# Patient Record
Sex: FEMALE | Race: WHITE | Employment: FULL TIME
[De-identification: names, ages, dates, MRNs, and addresses within clinical notes are randomized per-mention and may not be internally consistent; named-entity substitution may affect disease eponyms.]

---

## 2017-04-01 DIAGNOSIS — E87.6 HYPOKALEMIA: ICD-10-CM

## 2017-04-01 RX ORDER — POTASSIUM CHLORIDE 1500 MG/1
TABLET, EXTENDED RELEASE ORAL
Qty: 30 TABLET | Refills: 0 | Status: SHIPPED | OUTPATIENT
Start: 2017-04-01

## 2017-04-01 NOTE — LETTER
[facility]  [address]  [address] [facility] [address]      April 1, 2017      Buffy Palencia  [address] [place] [id number]        Dear Bfufy,    This letter is a reminder that you are overdue to see your Primary Care Provider for an Annual Visit and needed labs. You must be seen by your Primary Care Provider on a yearly basis and have appropriate labs drawn for continued care and prescription refills. Please call [phone] to schedule an appointment for an Annual Visit with Dr Angel QUILES.       [facility],    Primary Care Center

## 2017-04-01 NOTE — TELEPHONE ENCOUNTER
potassium chloride SA (POTASSIUM CHLORIDE) 20 MEQ tablet  Last Written Prescription Date:  12/30/15  Last Fill Quantity: 180,   # refills: 3  Last Office Visit with [facility], P or [facility] prescribing provider: 1/12/15  Future Office visit:   None    Potassium   Date Value Ref Range Status   12/24/2015 3.3 (L) 3.4 - 5.3 mmol/L Final   ]  appt letter sent      Routing refill request to provider for review/approval because:  potassium chloride SA (POTASSIUM CHLORIDE) 20 MEQ tablet    lv 1/15. Last labs 12/15. Rf? Qty?

## 2017-05-02 DIAGNOSIS — E87.6 HYPOKALEMIA: ICD-10-CM

## 2017-05-03 RX ORDER — POTASSIUM CHLORIDE 1500 MG/1
TABLET, EXTENDED RELEASE ORAL
Qty: 30 TABLET | Refills: 0 | OUTPATIENT
Start: 2017-05-03

## 2017-06-17 ENCOUNTER — HEALTH MAINTENANCE LETTER (OUTPATIENT)
Age: 72
End: 2017-06-17

## 2017-09-30 DIAGNOSIS — E78.5 HYPERLIPIDEMIA LDL GOAL <130: ICD-10-CM

## 2017-09-30 NOTE — TELEPHONE ENCOUNTER
simvastatin (ZOCOR) 20 MG tablet  Last Written Prescription Date:  6/24/16  Last Fill Quantity: 30,   # refills: 0  Last Office Visit with [facility], [facility] or [facility] prescribing provider: 1/12/15  Future Office visit:   none    Routing refill request to provider for review/approval because:    simvastatin (ZOCOR) 20 MG tablet  Last visit  1/12/15  rf?

## 2017-10-02 RX ORDER — SIMVASTATIN 20 MG
20 TABLET ORAL AT BEDTIME
Qty: 30 TABLET | Refills: 0 | Status: SHIPPED | OUTPATIENT
Start: 2017-10-02

## 2018-04-03 DIAGNOSIS — E78.5 HYPERLIPIDEMIA LDL GOAL <130: ICD-10-CM

## 2018-04-03 RX ORDER — SIMVASTATIN 20 MG
20 TABLET ORAL AT BEDTIME
Qty: 30 TABLET | Refills: 0 | OUTPATIENT
Start: 2018-04-03

## 2018-06-23 ENCOUNTER — HEALTH MAINTENANCE LETTER (OUTPATIENT)
Age: 73
End: 2018-06-23